# Patient Record
Sex: FEMALE | Race: WHITE | NOT HISPANIC OR LATINO | Employment: UNEMPLOYED | URBAN - METROPOLITAN AREA
[De-identification: names, ages, dates, MRNs, and addresses within clinical notes are randomized per-mention and may not be internally consistent; named-entity substitution may affect disease eponyms.]

---

## 2021-02-02 ENCOUNTER — LAB (OUTPATIENT)
Dept: LAB | Facility: HOSPITAL | Age: 86
End: 2021-02-02

## 2021-02-02 ENCOUNTER — TRANSCRIBE ORDERS (OUTPATIENT)
Dept: LAB | Facility: HOSPITAL | Age: 86
End: 2021-02-02

## 2021-02-02 DIAGNOSIS — R30.0 DYSURIA: Primary | ICD-10-CM

## 2021-02-02 DIAGNOSIS — R30.0 DYSURIA: ICD-10-CM

## 2021-02-02 PROCEDURE — 87186 SC STD MICRODIL/AGAR DIL: CPT

## 2021-02-02 PROCEDURE — 87086 URINE CULTURE/COLONY COUNT: CPT

## 2021-02-04 LAB — BACTERIA SPEC AEROBE CULT: ABNORMAL

## 2024-04-04 ENCOUNTER — NURSING HOME (OUTPATIENT)
Dept: INTERNAL MEDICINE | Facility: CLINIC | Age: 89
End: 2024-04-04
Payer: MEDICARE

## 2024-04-04 VITALS
RESPIRATION RATE: 18 BRPM | TEMPERATURE: 98.6 F | DIASTOLIC BLOOD PRESSURE: 85 MMHG | HEART RATE: 66 BPM | WEIGHT: 185.4 LBS | OXYGEN SATURATION: 99 % | SYSTOLIC BLOOD PRESSURE: 136 MMHG

## 2024-04-04 DIAGNOSIS — E03.9 HYPOTHYROIDISM, UNSPECIFIED TYPE: ICD-10-CM

## 2024-04-04 DIAGNOSIS — S82.851D CLOSED TRIMALLEOLAR FRACTURE OF RIGHT ANKLE WITH ROUTINE HEALING, SUBSEQUENT ENCOUNTER: Primary | ICD-10-CM

## 2024-04-04 DIAGNOSIS — H35.30 MACULAR DEGENERATION, UNSPECIFIED LATERALITY, UNSPECIFIED TYPE: ICD-10-CM

## 2024-04-04 DIAGNOSIS — E11.9 TYPE 2 DIABETES MELLITUS WITHOUT COMPLICATION, WITHOUT LONG-TERM CURRENT USE OF INSULIN: ICD-10-CM

## 2024-04-04 DIAGNOSIS — M81.0 OSTEOPOROSIS, UNSPECIFIED OSTEOPOROSIS TYPE, UNSPECIFIED PATHOLOGICAL FRACTURE PRESENCE: ICD-10-CM

## 2024-04-04 DIAGNOSIS — D50.0 BLOOD LOSS ANEMIA: ICD-10-CM

## 2024-04-04 DIAGNOSIS — F41.9 ANXIETY: ICD-10-CM

## 2024-04-04 DIAGNOSIS — I10 ESSENTIAL HYPERTENSION: ICD-10-CM

## 2024-04-04 DIAGNOSIS — K21.9 GASTROESOPHAGEAL REFLUX DISEASE WITHOUT ESOPHAGITIS: ICD-10-CM

## 2024-04-04 NOTE — LETTER
Nursing Home History and Physical       Jaxson Rodriguez DO  [address] [place] [address] Phone: [phone]  Fax: [phone]     PATIENT NAME: Dottie Jim                                                                          YOB: 1935           DATE OF SERVICE: 04/04/2024  FACILITY: [facility]    CHIEF COMPLAINT:  Nursing facility admission      HISTORY OF PRESENT ILLNESS:   Patient is an 88-year-old female with a history of dementia, type 2 diabetes mellitus on insulin, CKD, GERD, and hypothyroidism recently hospitalized at  after suffering a right ankle fracture.  Patient underwent TTC nail on 3/21/2024.  Her surgery was uncomplicated however postoperatively she did suffer with anemia and mild acute hypoxic respiratory failure.  Hemoglobin stabilized around 8 and there was no need for blood transfusion.  Hypoxia was thought to be secondary to atelectasis and resolved with supportive care.  Patient did have Enterococcus faecalis UTI and she was treated with ceftriaxone initially and transition to oral amoxicillin which she completed on 3/25/2024.  Patient has transferred to this facility for further strengthening and rehab as well as supportive care.    Patient was seen in her wheelchair as she was planning to see her orthopedic specialist this afternoon.  She remains nonweightbearing to the right lower extremity but has been working with physical therapy as best as she can.  Patient denied any significant complaints on exam today.  Glucose has been in reasonable control since admission to this facility.    PAST MEDICAL & SURGICAL HISTORY:   No past medical history on file.   No past surgical history on file.      MEDICATIONS:  I have reviewed and reconciled the patients medication list in the patients chart at the skilled nursing facility on 04/04/2024.      ALLERGIES:  Not on File      SOCIAL HISTORY:  Social History     Socioeconomic History    Marital  status:        FAMILY HISTORY:  No family history on file.     REVIEW OF SYSTEMS:  Review of Systems   Constitutional:  Negative for chills, fatigue and fever.   HENT:  Negative for congestion, ear pain, rhinorrhea, sinus pressure and sore throat.    Eyes:  Negative for visual disturbance.   Respiratory:  Negative for cough, chest tightness, shortness of breath and wheezing.    Cardiovascular:  Negative for chest pain, palpitations and leg swelling.   Gastrointestinal:  Negative for abdominal pain, blood in stool, constipation, diarrhea, nausea and vomiting.   Endocrine: Negative for polydipsia and polyuria.   Genitourinary:  Negative for dysuria and hematuria.   Musculoskeletal:  Negative for arthralgias and back pain.   Skin:  Negative for rash.   Neurological:  Negative for dizziness, light-headedness, numbness and headaches.   Psychiatric/Behavioral:  Negative for dysphoric mood and sleep disturbance. The patient is not nervous/anxious.          PHYSICAL EXAMINATION:   VITAL SIGNS: /85   Pulse 66   Temp 98.6 °F (37 °C)   Resp 18   Wt 84.1 kg (185 lb 6.4 oz)   SpO2 99%     Physical Exam  Vitals and nursing note reviewed.   Constitutional:       Appearance: Normal appearance. She is well-developed.   HENT:      Head: Normocephalic and atraumatic.      Nose: Nose normal.      Mouth/Throat:      Mouth: Mucous membranes are moist.      Pharynx: No oropharyngeal exudate.   Eyes:      General: No scleral icterus.     Conjunctiva/sclera: Conjunctivae normal.      Pupils: Pupils are equal, round, and reactive to light.   Neck:      Thyroid: No thyromegaly.   Cardiovascular:      Rate and Rhythm: Normal rate and regular rhythm.      Heart sounds: Normal heart sounds. No murmur heard.     No friction rub. No gallop.   Pulmonary:      Effort: Pulmonary effort is normal. No respiratory distress.      Breath sounds: Normal breath sounds. No wheezing.   Abdominal:      General: Bowel sounds are normal. There  is no distension.      Palpations: Abdomen is soft.      Tenderness: There is no abdominal tenderness.   Musculoskeletal:         General: No deformity or signs of injury.      Cervical back: Normal range of motion and neck supple.   Lymphadenopathy:      Cervical: No cervical adenopathy.   Skin:     General: Skin is warm and dry.      Findings: No rash.   Neurological:      Mental Status: She is alert and oriented to person, place, and time.   Psychiatric:         Mood and Affect: Mood normal.         Behavior: Behavior normal.         RECORDS REVIEW:   Discharge Summary from [facility] 3/28/2024  Labs 3/27/2024.  Please creatinine 0.99, sodium 135, glucose 187.  CBC hemoglobin 8.1    ASSESSMENT   Diagnoses and all orders for this visit:    1. Closed trimalleolar fracture of right ankle with routine healing, subsequent encounter (Primary)    2. Osteoporosis, unspecified osteoporosis type, unspecified pathological fracture presence    3. Blood loss anemia    4. Type 2 diabetes mellitus without complication, without long-term current use of insulin    5. Essential hypertension    6. Macular degeneration, unspecified laterality, unspecified type    7. Anxiety    8. Gastroesophageal reflux disease without esophagitis    9. Hypothyroidism, unspecified type        PLAN  Right trimalleolar ankle fracture with dislocation  - Status post TTC nail 3/21/2024  - Not currently on DVT prophylaxis.  - Currently NWB RLE with caution to wait bearing on left lower extremity due to history of fracture and left ankle.  - Pain control with acetaminophen as well as oxycodone as needed    Osteoporosis  - Continue vitamin D 1000U daily    Acute blood loss anemia  - Hemoglobin stabilized around 8 upon discharge from hospital follow-up CBC in 1 week.    Type 2 diabetes mellitus  - Continue current insulin regimen.  Glucose has been in reasonable control.    Hypertension  - Stable on current medication regimen.  Continue amlodipine.  It is  noted atenolol was held upon discharge from hospital.    Macular degeneration  - Continue current eyedrops    Anxiety  - Stable on hydroxyzine as needed    Asthma  - Stable on current inhaler regimen    GERD  - Continue Protonix    Hypothyroidism  - Stable on levothyroxine      [x]  Discussed Patient in detail with nursing/staff, addressed all needs today.     [x]  Plan of Care Reviewed   [x]  PT/OT Reviewed   []  Order Changes  []  Discharge Plans Reviewed  [x]  Advance Directive on file with Nursing Home.   [x]  POA on file with Nursing Home.    [x]  Code Status listed and reviewed.       Jaxson Rodriguez DO.  4/4/2024      **Part of this note may be an electronic transcription/translation of spoken language to printed text using the Dragon Dictation System.**

## 2024-04-05 NOTE — PROGRESS NOTES
Nursing Home History and Physical       Jaxson Rodriguez DO  [address] [place] [address] Phone: [phone]  Fax: [phone]     PATIENT NAME: Dottie Jim                                                                          YOB: 1935           DATE OF SERVICE: 04/04/2024  FACILITY: [facility]    CHIEF COMPLAINT:  Nursing facility admission      HISTORY OF PRESENT ILLNESS:   Patient is an 88-year-old female with a history of dementia, type 2 diabetes mellitus on insulin, CKD, GERD, and hypothyroidism recently hospitalized at  after suffering a right ankle fracture.  Patient underwent TTC nail on 3/21/2024.  Her surgery was uncomplicated however postoperatively she did suffer with anemia and mild acute hypoxic respiratory failure.  Hemoglobin stabilized around 8 and there was no need for blood transfusion.  Hypoxia was thought to be secondary to atelectasis and resolved with supportive care.  Patient did have Enterococcus faecalis UTI and she was treated with ceftriaxone initially and transition to oral amoxicillin which she completed on 3/25/2024.  Patient has transferred to this facility for further strengthening and rehab as well as supportive care.    Patient was seen in her wheelchair as she was planning to see her orthopedic specialist this afternoon.  She remains nonweightbearing to the right lower extremity but has been working with physical therapy as best as she can.  Patient denied any significant complaints on exam today.  Glucose has been in reasonable control since admission to this facility.    PAST MEDICAL & SURGICAL HISTORY:   No past medical history on file.   No past surgical history on file.      MEDICATIONS:  I have reviewed and reconciled the patients medication list in the patients chart at the skilled nursing facility on 04/04/2024.      ALLERGIES:  Not on File      SOCIAL HISTORY:  Social History     Socioeconomic History    Marital  status:        FAMILY HISTORY:  No family history on file.     REVIEW OF SYSTEMS:  Review of Systems   Constitutional:  Negative for chills, fatigue and fever.   HENT:  Negative for congestion, ear pain, rhinorrhea, sinus pressure and sore throat.    Eyes:  Negative for visual disturbance.   Respiratory:  Negative for cough, chest tightness, shortness of breath and wheezing.    Cardiovascular:  Negative for chest pain, palpitations and leg swelling.   Gastrointestinal:  Negative for abdominal pain, blood in stool, constipation, diarrhea, nausea and vomiting.   Endocrine: Negative for polydipsia and polyuria.   Genitourinary:  Negative for dysuria and hematuria.   Musculoskeletal:  Negative for arthralgias and back pain.   Skin:  Negative for rash.   Neurological:  Negative for dizziness, light-headedness, numbness and headaches.   Psychiatric/Behavioral:  Negative for dysphoric mood and sleep disturbance. The patient is not nervous/anxious.          PHYSICAL EXAMINATION:   VITAL SIGNS: /85   Pulse 66   Temp 98.6 °F (37 °C)   Resp 18   Wt 84.1 kg (185 lb 6.4 oz)   SpO2 99%     Physical Exam  Vitals and nursing note reviewed.   Constitutional:       Appearance: Normal appearance. She is well-developed.   HENT:      Head: Normocephalic and atraumatic.      Nose: Nose normal.      Mouth/Throat:      Mouth: Mucous membranes are moist.      Pharynx: No oropharyngeal exudate.   Eyes:      General: No scleral icterus.     Conjunctiva/sclera: Conjunctivae normal.      Pupils: Pupils are equal, round, and reactive to light.   Neck:      Thyroid: No thyromegaly.   Cardiovascular:      Rate and Rhythm: Normal rate and regular rhythm.      Heart sounds: Normal heart sounds. No murmur heard.     No friction rub. No gallop.   Pulmonary:      Effort: Pulmonary effort is normal. No respiratory distress.      Breath sounds: Normal breath sounds. No wheezing.   Abdominal:      General: Bowel sounds are normal. There  is no distension.      Palpations: Abdomen is soft.      Tenderness: There is no abdominal tenderness.   Musculoskeletal:         General: No deformity or signs of injury.      Cervical back: Normal range of motion and neck supple.   Lymphadenopathy:      Cervical: No cervical adenopathy.   Skin:     General: Skin is warm and dry.      Findings: No rash.   Neurological:      Mental Status: She is alert and oriented to person, place, and time.   Psychiatric:         Mood and Affect: Mood normal.         Behavior: Behavior normal.         RECORDS REVIEW:   Discharge Summary from [facility] 3/28/2024  Labs 3/27/2024.  Please creatinine 0.99, sodium 135, glucose 187.  CBC hemoglobin 8.1    ASSESSMENT   Diagnoses and all orders for this visit:    1. Closed trimalleolar fracture of right ankle with routine healing, subsequent encounter (Primary)    2. Osteoporosis, unspecified osteoporosis type, unspecified pathological fracture presence    3. Blood loss anemia    4. Type 2 diabetes mellitus without complication, without long-term current use of insulin    5. Essential hypertension    6. Macular degeneration, unspecified laterality, unspecified type    7. Anxiety    8. Gastroesophageal reflux disease without esophagitis    9. Hypothyroidism, unspecified type        PLAN  Right trimalleolar ankle fracture with dislocation  - Status post TTC nail 3/21/2024  - Not currently on DVT prophylaxis.  - Currently NWB RLE with caution to wait bearing on left lower extremity due to history of fracture and left ankle.  - Pain control with acetaminophen as well as oxycodone as needed    Osteoporosis  - Continue vitamin D 1000U daily    Acute blood loss anemia  - Hemoglobin stabilized around 8 upon discharge from hospital follow-up CBC in 1 week.    Type 2 diabetes mellitus  - Continue current insulin regimen.  Glucose has been in reasonable control.    Hypertension  - Stable on current medication regimen.  Continue amlodipine.  It is  noted atenolol was held upon discharge from hospital.    Macular degeneration  - Continue current eyedrops    Anxiety  - Stable on hydroxyzine as needed    Asthma  - Stable on current inhaler regimen    GERD  - Continue Protonix    Hypothyroidism  - Stable on levothyroxine      [x]  Discussed Patient in detail with nursing/staff, addressed all needs today.     [x]  Plan of Care Reviewed   [x]  PT/OT Reviewed   []  Order Changes  []  Discharge Plans Reviewed  [x]  Advance Directive on file with Nursing Home.   [x]  POA on file with Nursing Home.    [x]  Code Status listed and reviewed.       Jaxson Rodriguez DO.  4/4/2024      **Part of this note may be an electronic transcription/translation of spoken language to printed text using the Dragon Dictation System.**

## 2024-04-19 NOTE — PROGRESS NOTES
Nursing Home Progress Note        Jaxson Rodriguez    [address] [place] [address] Phone: [phone]  Fax: [phone]     PATIENT NAME: Dottie Jim                                                                          YOB: 1935           DATE OF SERVICE: 04/22/2024  FACILITY:   [facility] ______________________________________________________________________     CHIEF COMPLAINT:  Chronic Medical Management      History of Present Illness  The patient is being seen today at the nursing facility for a follow-up visit.    The patient reports experiencing continued foot pain, worsens after PT.  He is currently utilizing a boot on her right foot and has not yet been permitted to bear weight on the affected foot. She is scheduled for additional x-rays on xx/30/2023. She requires assistance with mobility, such as using the commode, and is expected to return home today. Her healthcare providers have successfully addressed all necessary arrangements, including a board for transferring. She resides with her children and has experienced one episode of diuresis.    Her discharge from the facility has been delayed due to need for IV abx for MDR UTI.  After IV medications are completed she may return home.         PAST MEDICAL & SURGICAL HISTORY:   Past Medical History:   Diagnosis Date    Acute respiratory failure with hypoxia     Allergic     Arthritis     CKD (chronic kidney disease)     Constipation, unspecified     Depression, unspecified     Displaced trimalleolar fracture of right lower leg, subsequent encounter for closed fracture with routine healing     Encephalopathy, unspecified     Essential hypertension     Fall at home     Heartburn     Hiatal hernia     Hiatal hernia with GERD without esophagitis     Hyperlipidemia     Hypothyroidism     Mood disorder     Muscle weakness (generalized)     Other iron deficiency anemias     Pain, unspecified     Type 2 diabetes  mellitus without complications     Unspecified asthma, uncomplicated     Unspecified dementia, unspecified severity, with anxiety     Unspecified macular degeneration     Urinary tract infection, site not specified       Past Surgical History:   Procedure Laterality Date    BACK SURGERY      FOOT FRACTURE SURGERY Right 03/2024    RIGHT HINDFOOT TTC FUSION NAIL         MEDICATIONS:  I have reviewed and reconciled the patients medication list in the patients chart at the skilled nursing facility today, 04/22/2024.      ALLERGIES:  Allergies   Allergen Reactions    Sulfamethoxazole-Trimethoprim Hives    Atorvastatin Unknown - High Severity         SOCIAL HISTORY:  Social History     Socioeconomic History    Marital status:    Tobacco Use    Smoking status: Unknown   Substance and Sexual Activity    Alcohol use: Defer    Drug use: Defer    Sexual activity: Defer       FAMILY HISTORY:  Family History   Problem Relation Age of Onset    Diabetes Sister     Hypertension Sister     Stroke Sister     Diabetes Brother     Hypertension Brother     Stroke Brother        REVIEW OF SYSTEMS:  Review of Systems   Constitutional:  Negative for chills, fatigue and fever.   HENT:  Negative for congestion, ear pain, rhinorrhea, sinus pressure and sore throat.    Eyes:  Negative for visual disturbance.   Respiratory:  Negative for cough, chest tightness, shortness of breath and wheezing.    Cardiovascular:  Negative for chest pain, palpitations and leg swelling.   Gastrointestinal:  Negative for abdominal pain, blood in stool, constipation, diarrhea, nausea and vomiting.   Endocrine: Negative for polydipsia and polyuria.   Genitourinary:  Negative for dysuria and hematuria.   Musculoskeletal:  Negative for arthralgias and back pain.   Skin:  Negative for rash.   Neurological:  Negative for dizziness, light-headedness, numbness and headaches.   Psychiatric/Behavioral:  Negative for dysphoric mood and sleep disturbance. The patient  is not nervous/anxious.           PHYSICAL EXAMINATION:     VITAL SIGNS:  /88   Pulse 74   Temp 98 °F (36.7 °C)   Resp 18   Wt 84.9 kg (187 lb 3.2 oz)   SpO2 95%     Physical Exam  Vitals and nursing note reviewed.   Constitutional:       Appearance: Normal appearance. She is well-developed.   HENT:      Head: Normocephalic and atraumatic.      Nose: Nose normal.      Mouth/Throat:      Mouth: Mucous membranes are moist.      Pharynx: No oropharyngeal exudate.   Eyes:      General: No scleral icterus.     Conjunctiva/sclera: Conjunctivae normal.      Pupils: Pupils are equal, round, and reactive to light.   Neck:      Thyroid: No thyromegaly.   Cardiovascular:      Rate and Rhythm: Normal rate and regular rhythm.      Heart sounds: Normal heart sounds. No murmur heard.     No friction rub. No gallop.   Pulmonary:      Effort: Pulmonary effort is normal. No respiratory distress.      Breath sounds: Normal breath sounds. No wheezing.   Abdominal:      General: Bowel sounds are normal. There is no distension.      Palpations: Abdomen is soft.      Tenderness: There is no abdominal tenderness.   Musculoskeletal:         General: No deformity or signs of injury.      Cervical back: Normal range of motion and neck supple.   Lymphadenopathy:      Cervical: No cervical adenopathy.   Skin:     General: Skin is warm and dry.      Findings: No rash.   Neurological:      Mental Status: She is alert and oriented to person, place, and time.   Psychiatric:         Mood and Affect: Mood normal.         Behavior: Behavior normal.       RECORDS REVIEW:   [unfilled]     ASSESSMENT     Diagnoses and all orders for this visit:    1. Closed trimalleolar fracture of right ankle with routine healing, subsequent encounter (Primary)    2. Osteoporosis, unspecified osteoporosis type, unspecified pathological fracture presence    3. Type 2 diabetes mellitus without complication, without long-term current use of insulin    4.  Gastroesophageal reflux disease without esophagitis    5. Anxiety    6. Essential hypertension    7. Hypothyroidism, unspecified type        PLAN  Assessment & Plan  Pain in the right foot.  The patient is advised to consult with the orthopedic specialist regarding her foot condition. Her medication regimen and laboratory results will be closely monitored. A short supply of pain medication will be provided until her subsequent appointment with her primary care physician, at which point the prescription will be refilled accordingly as outpatient.     Right trimalleolar ankle fracture with dislocation  - Status post TTC nail 3/21/2024  - Not currently on DVT prophylaxis.  - Remains NWB RLE with caution to wait bearing on left lower extremity due to history of fracture and left ankle.  - Pain control with acetaminophen as well as oxycodone as needed    MDR UTI  - complete IV abx then plan to return home.     Osteoporosis  - Continue vitamin D 1000U daily    Acute blood loss anemia  - Hb has post operatively been stable.     Type 2 diabetes mellitus  - Continue current insulin regimen in facility.  Glucose has been in reasonable control.    Hypertension  - Stable on current medication regimen.  Continue amlodipine.  Remains off Atenolol.     Macular degeneration  - Continue current eyedrops    Anxiety  - Stable on hydroxyzine as needed    Asthma  - Stable on current inhaler regimen    GERD  - Continue Protonix    Hypothyroidism  - Stable on levothyroxine        [x]  Discussed Patient in detail with nursing/staff, addressed all needs today.     [x]  Plan of Care Reviewed   []  PT/OT Reviewed   [x]  Order Changes  []  Discharge Plans Reviewed  [x]  Advance Directive on file with Nursing Home.   [x]  POA on file with Nursing Home.    [x]  Code Status listed and reviewed.     I confirm accuracy of unchanged data/findings including physical exam and plan which have been carried forward from previous visit, as well as I have  updated appropriately those that have changed        Jaxson Rodriguez DO.  4/30/2024      Patient or patient representative verbalized consent for the use of Ambient Listening during the visit with  Jaxson Rodriguez DO for chart documentation. 4/30/2024  17:49 EDT

## 2024-04-22 ENCOUNTER — NURSING HOME (OUTPATIENT)
Dept: INTERNAL MEDICINE | Facility: CLINIC | Age: 89
End: 2024-04-22
Payer: MEDICARE

## 2024-04-22 VITALS
DIASTOLIC BLOOD PRESSURE: 88 MMHG | OXYGEN SATURATION: 95 % | WEIGHT: 187.2 LBS | SYSTOLIC BLOOD PRESSURE: 149 MMHG | RESPIRATION RATE: 18 BRPM | TEMPERATURE: 98 F | HEART RATE: 74 BPM

## 2024-04-22 DIAGNOSIS — S82.851D CLOSED TRIMALLEOLAR FRACTURE OF RIGHT ANKLE WITH ROUTINE HEALING, SUBSEQUENT ENCOUNTER: Primary | ICD-10-CM

## 2024-04-22 DIAGNOSIS — M81.0 OSTEOPOROSIS, UNSPECIFIED OSTEOPOROSIS TYPE, UNSPECIFIED PATHOLOGICAL FRACTURE PRESENCE: ICD-10-CM

## 2024-04-22 DIAGNOSIS — E11.9 TYPE 2 DIABETES MELLITUS WITHOUT COMPLICATION, WITHOUT LONG-TERM CURRENT USE OF INSULIN: ICD-10-CM

## 2024-04-22 DIAGNOSIS — K21.9 GASTROESOPHAGEAL REFLUX DISEASE WITHOUT ESOPHAGITIS: ICD-10-CM

## 2024-04-22 DIAGNOSIS — I10 ESSENTIAL HYPERTENSION: ICD-10-CM

## 2024-04-22 DIAGNOSIS — F41.9 ANXIETY: ICD-10-CM

## 2024-04-22 DIAGNOSIS — E03.9 HYPOTHYROIDISM, UNSPECIFIED TYPE: ICD-10-CM

## 2024-04-22 PROCEDURE — 99308 SBSQ NF CARE LOW MDM 20: CPT | Performed by: INTERNAL MEDICINE

## 2024-04-22 NOTE — LETTER
Nursing Home Progress Note        Jaxson Rodriguez    [address] [place] [address] Phone: [phone]  Fax: [phone]     PATIENT NAME: Dottie Jim                                                                          YOB: 1935           DATE OF SERVICE: 04/22/2024  FACILITY:   [facility] ______________________________________________________________________     CHIEF COMPLAINT:  Chronic Medical Management      History of Present Illness  The patient is being seen today at the nursing facility for a follow-up visit.    The patient reports experiencing continued foot pain, worsens after PT.  He is currently utilizing a boot on her right foot and has not yet been permitted to bear weight on the affected foot. She is scheduled for additional x-rays on xx/30/2023. She requires assistance with mobility, such as using the commode, and is expected to return home today. Her healthcare providers have successfully addressed all necessary arrangements, including a board for transferring. She resides with her children and has experienced one episode of diuresis.    Her discharge from the facility has been delayed due to need for IV abx for MDR UTI.  After IV medications are completed she may return home.         PAST MEDICAL & SURGICAL HISTORY:   Past Medical History:   Diagnosis Date    Acute respiratory failure with hypoxia     Allergic     Arthritis     CKD (chronic kidney disease)     Constipation, unspecified     Depression, unspecified     Displaced trimalleolar fracture of right lower leg, subsequent encounter for closed fracture with routine healing     Encephalopathy, unspecified     Essential hypertension     Fall at home     Heartburn     Hiatal hernia     Hiatal hernia with GERD without esophagitis     Hyperlipidemia     Hypothyroidism     Mood disorder     Muscle weakness (generalized)     Other iron deficiency anemias     Pain, unspecified     Type 2 diabetes  mellitus without complications     Unspecified asthma, uncomplicated     Unspecified dementia, unspecified severity, with anxiety     Unspecified macular degeneration     Urinary tract infection, site not specified       Past Surgical History:   Procedure Laterality Date    BACK SURGERY      FOOT FRACTURE SURGERY Right 03/2024    RIGHT HINDFOOT TTC FUSION NAIL         MEDICATIONS:  I have reviewed and reconciled the patients medication list in the patients chart at the skilled nursing facility today, 04/22/2024.      ALLERGIES:  Allergies   Allergen Reactions    Sulfamethoxazole-Trimethoprim Hives    Atorvastatin Unknown - High Severity         SOCIAL HISTORY:  Social History     Socioeconomic History    Marital status:    Tobacco Use    Smoking status: Unknown   Substance and Sexual Activity    Alcohol use: Defer    Drug use: Defer    Sexual activity: Defer       FAMILY HISTORY:  Family History   Problem Relation Age of Onset    Diabetes Sister     Hypertension Sister     Stroke Sister     Diabetes Brother     Hypertension Brother     Stroke Brother        REVIEW OF SYSTEMS:  Review of Systems   Constitutional:  Negative for chills, fatigue and fever.   HENT:  Negative for congestion, ear pain, rhinorrhea, sinus pressure and sore throat.    Eyes:  Negative for visual disturbance.   Respiratory:  Negative for cough, chest tightness, shortness of breath and wheezing.    Cardiovascular:  Negative for chest pain, palpitations and leg swelling.   Gastrointestinal:  Negative for abdominal pain, blood in stool, constipation, diarrhea, nausea and vomiting.   Endocrine: Negative for polydipsia and polyuria.   Genitourinary:  Negative for dysuria and hematuria.   Musculoskeletal:  Negative for arthralgias and back pain.   Skin:  Negative for rash.   Neurological:  Negative for dizziness, light-headedness, numbness and headaches.   Psychiatric/Behavioral:  Negative for dysphoric mood and sleep disturbance. The patient  is not nervous/anxious.           PHYSICAL EXAMINATION:     VITAL SIGNS:  /88   Pulse 74   Temp 98 °F (36.7 °C)   Resp 18   Wt 84.9 kg (187 lb 3.2 oz)   SpO2 95%     Physical Exam  Vitals and nursing note reviewed.   Constitutional:       Appearance: Normal appearance. She is well-developed.   HENT:      Head: Normocephalic and atraumatic.      Nose: Nose normal.      Mouth/Throat:      Mouth: Mucous membranes are moist.      Pharynx: No oropharyngeal exudate.   Eyes:      General: No scleral icterus.     Conjunctiva/sclera: Conjunctivae normal.      Pupils: Pupils are equal, round, and reactive to light.   Neck:      Thyroid: No thyromegaly.   Cardiovascular:      Rate and Rhythm: Normal rate and regular rhythm.      Heart sounds: Normal heart sounds. No murmur heard.     No friction rub. No gallop.   Pulmonary:      Effort: Pulmonary effort is normal. No respiratory distress.      Breath sounds: Normal breath sounds. No wheezing.   Abdominal:      General: Bowel sounds are normal. There is no distension.      Palpations: Abdomen is soft.      Tenderness: There is no abdominal tenderness.   Musculoskeletal:         General: No deformity or signs of injury.      Cervical back: Normal range of motion and neck supple.   Lymphadenopathy:      Cervical: No cervical adenopathy.   Skin:     General: Skin is warm and dry.      Findings: No rash.   Neurological:      Mental Status: She is alert and oriented to person, place, and time.   Psychiatric:         Mood and Affect: Mood normal.         Behavior: Behavior normal.       RECORDS REVIEW:   [unfilled]     ASSESSMENT     Diagnoses and all orders for this visit:    1. Closed trimalleolar fracture of right ankle with routine healing, subsequent encounter (Primary)    2. Osteoporosis, unspecified osteoporosis type, unspecified pathological fracture presence    3. Type 2 diabetes mellitus without complication, without long-term current use of insulin    4.  Gastroesophageal reflux disease without esophagitis    5. Anxiety    6. Essential hypertension    7. Hypothyroidism, unspecified type        PLAN  Assessment & Plan  Pain in the right foot.  The patient is advised to consult with the orthopedic specialist regarding her foot condition. Her medication regimen and laboratory results will be closely monitored. A short supply of pain medication will be provided until her subsequent appointment with her primary care physician, at which point the prescription will be refilled accordingly as outpatient.     Right trimalleolar ankle fracture with dislocation  - Status post TTC nail 3/21/2024  - Not currently on DVT prophylaxis.  - Remains NWB RLE with caution to wait bearing on left lower extremity due to history of fracture and left ankle.  - Pain control with acetaminophen as well as oxycodone as needed    MDR UTI  - complete IV abx then plan to return home.     Osteoporosis  - Continue vitamin D 1000U daily    Acute blood loss anemia  - Hb has post operatively been stable.     Type 2 diabetes mellitus  - Continue current insulin regimen in facility.  Glucose has been in reasonable control.    Hypertension  - Stable on current medication regimen.  Continue amlodipine.  Remains off Atenolol.     Macular degeneration  - Continue current eyedrops    Anxiety  - Stable on hydroxyzine as needed    Asthma  - Stable on current inhaler regimen    GERD  - Continue Protonix    Hypothyroidism  - Stable on levothyroxine        [x]  Discussed Patient in detail with nursing/staff, addressed all needs today.     [x]  Plan of Care Reviewed   []  PT/OT Reviewed   [x]  Order Changes  []  Discharge Plans Reviewed  [x]  Advance Directive on file with Nursing Home.   [x]  POA on file with Nursing Home.    [x]  Code Status listed and reviewed.     I confirm accuracy of unchanged data/findings including physical exam and plan which have been carried forward from previous visit, as well as I have  updated appropriately those that have changed        Jaxson Rodriguez DO.  4/30/2024      Patient or patient representative verbalized consent for the use of Ambient Listening during the visit with  Jaxson Rodriguez DO for chart documentation. 4/30/2024  17:49 EDT

## 2024-04-23 ENCOUNTER — DOCUMENTATION (OUTPATIENT)
Dept: FAMILY MEDICINE CLINIC | Facility: CLINIC | Age: 89
End: 2024-04-23
Payer: MEDICARE

## 2024-04-23 RX ORDER — FLUTICASONE FUROATE, UMECLIDINIUM BROMIDE AND VILANTEROL TRIFENATATE 100; 62.5; 25 UG/1; UG/1; UG/1
1 POWDER RESPIRATORY (INHALATION)
COMMUNITY

## 2024-04-23 RX ORDER — LEVOTHYROXINE SODIUM 88 UG/1
88 TABLET ORAL DAILY
COMMUNITY

## 2024-04-23 RX ORDER — DULOXETIN HYDROCHLORIDE 60 MG/1
60 CAPSULE, DELAYED RELEASE ORAL DAILY
COMMUNITY

## 2024-04-23 RX ORDER — CHOLECALCIFEROL (VITAMIN D3) 125 MCG
5 CAPSULE ORAL NIGHTLY
COMMUNITY

## 2024-04-23 RX ORDER — ALBUTEROL SULFATE 90 UG/1
2 AEROSOL, METERED RESPIRATORY (INHALATION) EVERY 4 HOURS PRN
COMMUNITY

## 2024-04-23 RX ORDER — MONTELUKAST SODIUM 10 MG/1
10 TABLET ORAL NIGHTLY
COMMUNITY

## 2024-04-23 RX ORDER — VIT A/VIT C/VIT E/ZINC/COPPER 2148-113
1 TABLET ORAL DAILY
COMMUNITY

## 2024-04-23 RX ORDER — SUCRALFATE 1 G/1
1 TABLET ORAL 4 TIMES DAILY
COMMUNITY

## 2024-04-23 RX ORDER — GABAPENTIN 100 MG/1
100 CAPSULE ORAL 3 TIMES DAILY
COMMUNITY

## 2024-04-23 RX ORDER — CALCIUM CARBONATE 500 MG/1
1 TABLET, CHEWABLE ORAL EVERY 4 HOURS PRN
COMMUNITY

## 2024-04-23 RX ORDER — AMLODIPINE BESYLATE 5 MG/1
5 TABLET ORAL DAILY
COMMUNITY

## 2024-04-23 RX ORDER — FLUTICASONE PROPIONATE 50 MCG
2 SPRAY, SUSPENSION (ML) NASAL DAILY
COMMUNITY

## 2024-04-23 RX ORDER — FLUORIDE TOOTHPASTE
15 TOOTHPASTE DENTAL AS NEEDED
COMMUNITY

## 2024-04-23 RX ORDER — ACETAMINOPHEN 500 MG
1000 TABLET ORAL 3 TIMES DAILY
COMMUNITY

## 2024-04-23 RX ORDER — ONDANSETRON 4 MG/1
4 TABLET, FILM COATED ORAL EVERY 8 HOURS PRN
COMMUNITY

## 2024-04-23 RX ORDER — PHENAZOPYRIDINE HYDROCHLORIDE 100 MG/1
100 TABLET, FILM COATED ORAL 3 TIMES DAILY PRN
COMMUNITY

## 2024-04-23 RX ORDER — PRAVASTATIN SODIUM 40 MG
40 TABLET ORAL DAILY
COMMUNITY

## 2024-04-23 RX ORDER — POLYETHYLENE GLYCOL 3350 17 G/17G
17 POWDER, FOR SOLUTION ORAL DAILY
COMMUNITY

## 2024-04-23 RX ORDER — OXYCODONE HYDROCHLORIDE 5 MG/1
5 TABLET ORAL EVERY 8 HOURS
COMMUNITY

## 2024-04-23 RX ORDER — CILOSTAZOL 100 MG/1
100 TABLET ORAL 2 TIMES DAILY
COMMUNITY

## 2024-04-23 RX ORDER — SENNOSIDES A AND B 8.6 MG/1
2 TABLET, FILM COATED ORAL DAILY
COMMUNITY

## 2024-04-23 RX ORDER — METHOCARBAMOL 500 MG/1
500 TABLET, FILM COATED ORAL 3 TIMES DAILY PRN
COMMUNITY

## 2024-04-23 RX ORDER — INSULIN GLARGINE 100 [IU]/ML
8 INJECTION, SOLUTION SUBCUTANEOUS DAILY
COMMUNITY

## 2024-04-23 RX ORDER — OMEPRAZOLE 20 MG/1
20 CAPSULE, DELAYED RELEASE ORAL DAILY
COMMUNITY

## 2024-04-23 RX ORDER — HYDROXYZINE HYDROCHLORIDE 10 MG/1
10 TABLET, FILM COATED ORAL 3 TIMES DAILY
COMMUNITY

## 2024-04-23 RX ORDER — ACETAMINOPHEN 500 MG
1000 TABLET ORAL EVERY 6 HOURS PRN
COMMUNITY

## 2024-04-23 NOTE — TELEPHONE ENCOUNTER
[facility] REQUESTING MED REFILL FOR OXYCODONE 5 MG.    DIRECTIONS: OXYCODONE 5 MG 1 TAB PO Q 8 HRS.

## 2024-04-24 RX ORDER — OXYCODONE HYDROCHLORIDE 5 MG/1
5 TABLET ORAL EVERY 8 HOURS
Qty: 90 TABLET | Refills: 0 | Status: SHIPPED | OUTPATIENT
Start: 2024-04-24

## 2025-05-15 ENCOUNTER — NURSING HOME (OUTPATIENT)
Dept: INTERNAL MEDICINE | Facility: CLINIC | Age: 89
End: 2025-05-15
Payer: MEDICARE

## 2025-05-15 VITALS
DIASTOLIC BLOOD PRESSURE: 68 MMHG | OXYGEN SATURATION: 94 % | TEMPERATURE: 98 F | HEIGHT: 67 IN | HEART RATE: 78 BPM | BODY MASS INDEX: 28.97 KG/M2 | WEIGHT: 184.6 LBS | SYSTOLIC BLOOD PRESSURE: 144 MMHG | RESPIRATION RATE: 20 BRPM

## 2025-05-15 DIAGNOSIS — N39.0 SEPSIS DUE TO URINARY TRACT INFECTION: ICD-10-CM

## 2025-05-15 DIAGNOSIS — G30.1 MODERATE LATE ONSET ALZHEIMER'S DEMENTIA WITH ANXIETY: ICD-10-CM

## 2025-05-15 DIAGNOSIS — H35.30 MACULAR DEGENERATION, UNSPECIFIED LATERALITY, UNSPECIFIED TYPE: ICD-10-CM

## 2025-05-15 DIAGNOSIS — K21.9 GASTROESOPHAGEAL REFLUX DISEASE WITHOUT ESOPHAGITIS: ICD-10-CM

## 2025-05-15 DIAGNOSIS — A41.9 SEPSIS DUE TO URINARY TRACT INFECTION: ICD-10-CM

## 2025-05-15 DIAGNOSIS — L89.229 PRESSURE INJURY OF SKIN OF LEFT THIGH, UNSPECIFIED INJURY STAGE: ICD-10-CM

## 2025-05-15 DIAGNOSIS — E11.9 TYPE 2 DIABETES MELLITUS WITHOUT COMPLICATION, WITHOUT LONG-TERM CURRENT USE OF INSULIN: ICD-10-CM

## 2025-05-15 DIAGNOSIS — I10 ESSENTIAL HYPERTENSION: ICD-10-CM

## 2025-05-15 DIAGNOSIS — S82.851D CLOSED TRIMALLEOLAR FRACTURE OF RIGHT ANKLE WITH ROUTINE HEALING, SUBSEQUENT ENCOUNTER: Primary | ICD-10-CM

## 2025-05-15 DIAGNOSIS — M81.0 OSTEOPOROSIS, UNSPECIFIED OSTEOPOROSIS TYPE, UNSPECIFIED PATHOLOGICAL FRACTURE PRESENCE: ICD-10-CM

## 2025-05-15 DIAGNOSIS — R07.89 OTHER CHEST PAIN: ICD-10-CM

## 2025-05-15 DIAGNOSIS — G89.4 CHRONIC PAIN SYNDROME: ICD-10-CM

## 2025-05-15 DIAGNOSIS — D50.0 BLOOD LOSS ANEMIA: ICD-10-CM

## 2025-05-15 DIAGNOSIS — F02.B4 MODERATE LATE ONSET ALZHEIMER'S DEMENTIA WITH ANXIETY: ICD-10-CM

## 2025-05-15 DIAGNOSIS — E03.9 HYPOTHYROIDISM, UNSPECIFIED TYPE: ICD-10-CM

## 2025-05-15 DIAGNOSIS — F41.9 ANXIETY: ICD-10-CM

## 2025-05-15 NOTE — LETTER
Nursing Home History and Physical       Jaxson Rodriguez DO  [address] [place] [address] Phone: [phone]  Fax: [phone]     PATIENT NAME: Dottie Jim                                                                          YOB: 1935           DATE OF SERVICE: 05/15/2025  FACILITY:  [facility]    CHIEF COMPLAINT:  Nursing facility admission    History of Present Illness  The patient is an 89-year-old female with a history of dementia, arthritis, and asthma, recently hospitalized at [facility] from 05/01/2025 to 05/10/2025 for concerns of malaise and worsening pressure injury to the left lateral thigh. Upon presentation to the emergency room, she was also found to have sepsis as well as ESBL UTI. She completed antibiotics during hospitalization. Due to altered mental status, her Cymbalta and Remeron were initially on hold but were then restarted upon discharge from the hospital. She has completed Invanz and Zyvox for her UTI, pneumonia, and pressure wound.     She reports a recent fall, which resulted in a fracture that necessitated surgical intervention and subsequent hospitalization. She has been informed that a third fracture of the same ankle may not be amenable to surgical repair. She has been using a walker for mobility.    She continues to experience chest pain, which her daughter attributes to a hernia.    PAST SURGICAL HISTORY:  - Bedside debridement of left thigh decubitus wound on 05/02/2025  - Surgical intervention for recent fracture       PAST MEDICAL & SURGICAL HISTORY:   Past Medical History:   Diagnosis Date   • Acute respiratory failure with hypoxia    • Allergic    • Arthritis    • CKD (chronic kidney disease)    • Constipation, unspecified    • Depression, unspecified    • Displaced trimalleolar fracture of right lower leg, subsequent encounter for closed fracture with routine healing    • Encephalopathy, unspecified    • Essential  hypertension    • Fall at home    • Heartburn    • Hiatal hernia    • Hiatal hernia with GERD without esophagitis    • Hyperlipidemia    • Hypothyroidism    • Mood disorder    • Muscle weakness (generalized)    • Other iron deficiency anemias    • Pain, unspecified    • Type 2 diabetes mellitus without complications    • Unspecified asthma, uncomplicated    • Unspecified dementia, unspecified severity, with anxiety    • Unspecified macular degeneration    • Urinary tract infection, site not specified       Past Surgical History:   Procedure Laterality Date   • BACK SURGERY     • FOOT FRACTURE SURGERY Right 03/2024    RIGHT HINDFOOT TTC FUSION NAIL         MEDICATIONS:  I have reviewed and reconciled the patients medication list in the patients chart at the [facility] nursing facility on 05/15/2025.      ALLERGIES:  Allergies   Allergen Reactions   • Sulfamethoxazole-Trimethoprim Hives   • Atorvastatin Unknown - High Severity         SOCIAL HISTORY:  Social History     Socioeconomic History   • Marital status:    Tobacco Use   • Smoking status: Unknown   Substance and Sexual Activity   • Alcohol use: Defer   • Drug use: Defer   • Sexual activity: Defer       FAMILY HISTORY:  Family History   Problem Relation Age of Onset   • Diabetes Sister    • Hypertension Sister    • Stroke Sister    • Diabetes Brother    • Hypertension Brother    • Stroke Brother         REVIEW OF SYSTEMS:  Review of Systems   Constitutional:  Negative for chills, fatigue and fever.   HENT:  Negative for congestion, ear pain, rhinorrhea, sinus pressure and sore throat.    Eyes:  Negative for visual disturbance.   Respiratory:  Negative for cough, chest tightness, shortness of breath and wheezing.    Cardiovascular:  Negative for chest pain, palpitations and leg swelling.   Gastrointestinal:  Negative for abdominal pain, blood in stool, constipation, diarrhea, nausea and vomiting.   Endocrine: Negative for polydipsia and polyuria.  "  Genitourinary:  Negative for dysuria and hematuria.   Musculoskeletal:  Negative for arthralgias and back pain.   Skin:  Negative for rash.   Neurological:  Negative for dizziness, light-headedness, numbness and headaches.   Psychiatric/Behavioral:  Negative for dysphoric mood and sleep disturbance. The patient is not nervous/anxious.          PHYSICAL EXAMINATION:   VITAL SIGNS: /68   Pulse 78   Temp 98 °F (36.7 °C)   Resp 20   Ht 170.2 cm (67\")   Wt 83.7 kg (184 lb 9.6 oz)   SpO2 94%   BMI 28.91 kg/m²     Physical Exam  Vitals and nursing note reviewed.   Constitutional:       Appearance: Normal appearance. She is well-developed.   HENT:      Head: Normocephalic and atraumatic.      Nose: Nose normal.      Mouth/Throat:      Mouth: Mucous membranes are moist.      Pharynx: No oropharyngeal exudate.   Eyes:      General: No scleral icterus.     Conjunctiva/sclera: Conjunctivae normal.      Pupils: Pupils are equal, round, and reactive to light.   Neck:      Thyroid: No thyromegaly.   Cardiovascular:      Rate and Rhythm: Normal rate and regular rhythm.      Heart sounds: Normal heart sounds. No murmur heard.     No friction rub. No gallop.   Pulmonary:      Effort: Pulmonary effort is normal. No respiratory distress.      Breath sounds: Normal breath sounds. No wheezing.   Abdominal:      General: Bowel sounds are normal. There is no distension.      Palpations: Abdomen is soft.      Tenderness: There is no abdominal tenderness.   Musculoskeletal:         General: No deformity or signs of injury.      Cervical back: Normal range of motion and neck supple.   Lymphadenopathy:      Cervical: No cervical adenopathy.   Skin:     General: Skin is warm and dry.      Findings: No rash.   Neurological:      Mental Status: She is alert and oriented to person, place, and time.   Psychiatric:         Mood and Affect: Mood normal.         Behavior: Behavior normal.         RECORDS REVIEW:   Discharge Summary St " [facility] 5/10/2025  Results      ASSESSMENT   Diagnoses and all orders for this visit:    1. Closed trimalleolar fracture of right ankle with routine healing, subsequent encounter (Primary)    2. Osteoporosis, unspecified osteoporosis type, unspecified pathological fracture presence    3. Type 2 diabetes mellitus without complication, without long-term current use of insulin    4. Gastroesophageal reflux disease without esophagitis    5. Blood loss anemia    6. Hypothyroidism, unspecified type    7. Essential hypertension    8. Anxiety    9. Macular degeneration, unspecified laterality, unspecified type    10. Sepsis due to urinary tract infection    11. Pressure injury of skin of left thigh, unspecified injury stage    12. 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 Chronic pain syndrome    14. Other chest pain        Assessment & Plan  1. Sepsis secondary to recurrent urinary tract infections.  - This condition is currently resolved. Vigilant monitoring for any signs of recurrent symptoms is necessary.    2. Pneumonia with known right-sided pleurisy.  - The condition is stable at present, with respiratory function at baseline.    3. Left thigh decubitus wound.  - She underwent bedside debridement on 05/02/2025 in the hospital and has since shown improvement.  - She has completed her antibiotic course in the hospital.  - Continued monitoring for signs of worsening infection is essential.    4. Dementia.  - Supportive care in the nursing facility will be continued.    5. Type 2 diabetes mellitus.  - Her condition is under fair control with the current regimen, which includes Tresiba 16 units daily.    6. Mood disorder and anxiety.  - The current treatment plan includes Seroquel 25 mg nightly, Remeron 7.5 mg nightly, and hydroxyzine as needed for anxiety.    7. Chronic pain.  - Pain management is being overseen by a specialist in the facility to assist with medication management.  - The  current treatment plan includes gabapentin and Norco.    8. Peripheral artery disease.  - The current treatment plan includes cilostazol.    9. Hypertension.  - The current treatment plan includes amlodipine 10 mg daily.    10. DVT prophylaxis.  - She is on DVT prophylaxis with heparin, which should be continued until 05/20/2025.    11. Chest pain- non cardiac  - She continues to experience chest pain, which her daughter attributes to a hernia.       [x]  Discussed Patient in detail with nursing/staff, addressed all needs today.     [x]  Plan of Care Reviewed   [x]  PT/OT Reviewed   []  Order Changes  []  Discharge Plans Reviewed  [x]  Advance Directive on file with Nursing Home.   [x]  POA on file with Nursing Home.    [x]  Code Status listed and reviewed.     Jaxson Rodriguez DO.  5/17/2025      **Part of this note may be an electronic transcription/translation of spoken language to printed text using the Dragon Dictation System.**    Patient or patient representative verbalized consent for the use of Ambient Listening during the visit with  Jaxson Rodriguez DO for chart documentation. 5/17/2025  10:32 EDT

## 2025-05-16 NOTE — PROGRESS NOTES
Nursing Home History and Physical       Jaxson Rodriguez DO  [address] [place] [address] Phone: [phone]  Fax: [phone]     PATIENT NAME: Dottie Jim                                                                          YOB: 1935           DATE OF SERVICE: 05/15/2025  FACILITY:  [facility]    CHIEF COMPLAINT:  Nursing facility admission    History of Present Illness  The patient is an 89-year-old female with a history of dementia, arthritis, and asthma, recently hospitalized at [facility] from 05/01/2025 to 05/10/2025 for concerns of malaise and worsening pressure injury to the left lateral thigh. Upon presentation to the emergency room, she was also found to have sepsis as well as ESBL UTI. She completed antibiotics during hospitalization. Due to altered mental status, her Cymbalta and Remeron were initially on hold but were then restarted upon discharge from the hospital. She has completed Invanz and Zyvox for her UTI, pneumonia, and pressure wound.     She reports a recent fall, which resulted in a fracture that necessitated surgical intervention and subsequent hospitalization. She has been informed that a third fracture of the same ankle may not be amenable to surgical repair. She has been using a walker for mobility.    She continues to experience chest pain, which her daughter attributes to a hernia.    PAST SURGICAL HISTORY:  - Bedside debridement of left thigh decubitus wound on 05/02/2025  - Surgical intervention for recent fracture       PAST MEDICAL & SURGICAL HISTORY:   Past Medical History:   Diagnosis Date    Acute respiratory failure with hypoxia     Allergic     Arthritis     CKD (chronic kidney disease)     Constipation, unspecified     Depression, unspecified     Displaced trimalleolar fracture of right lower leg, subsequent encounter for closed fracture with routine healing     Encephalopathy, unspecified     Essential hypertension      Fall at home     Heartburn     Hiatal hernia     Hiatal hernia with GERD without esophagitis     Hyperlipidemia     Hypothyroidism     Mood disorder     Muscle weakness (generalized)     Other iron deficiency anemias     Pain, unspecified     Type 2 diabetes mellitus without complications     Unspecified asthma, uncomplicated     Unspecified dementia, unspecified severity, with anxiety     Unspecified macular degeneration     Urinary tract infection, site not specified       Past Surgical History:   Procedure Laterality Date    BACK SURGERY      FOOT FRACTURE SURGERY Right 03/2024    RIGHT HINDFOOT TTC FUSION NAIL         MEDICATIONS:  I have reviewed and reconciled the patients medication list in the patients chart at the skilled nursing facility on 05/15/2025.      ALLERGIES:  Allergies   Allergen Reactions    Sulfamethoxazole-Trimethoprim Hives    Atorvastatin Unknown - High Severity         SOCIAL HISTORY:  Social History     Socioeconomic History    Marital status:    Tobacco Use    Smoking status: Unknown   Substance and Sexual Activity    Alcohol use: Defer    Drug use: Defer    Sexual activity: Defer       FAMILY HISTORY:  Family History   Problem Relation Age of Onset    Diabetes Sister     Hypertension Sister     Stroke Sister     Diabetes Brother     Hypertension Brother     Stroke Brother         REVIEW OF SYSTEMS:  Review of Systems   Constitutional:  Negative for chills, fatigue and fever.   HENT:  Negative for congestion, ear pain, rhinorrhea, sinus pressure and sore throat.    Eyes:  Negative for visual disturbance.   Respiratory:  Negative for cough, chest tightness, shortness of breath and wheezing.    Cardiovascular:  Negative for chest pain, palpitations and leg swelling.   Gastrointestinal:  Negative for abdominal pain, blood in stool, constipation, diarrhea, nausea and vomiting.   Endocrine: Negative for polydipsia and polyuria.   Genitourinary:  Negative for dysuria and hematuria.  "  Musculoskeletal:  Negative for arthralgias and back pain.   Skin:  Negative for rash.   Neurological:  Negative for dizziness, light-headedness, numbness and headaches.   Psychiatric/Behavioral:  Negative for dysphoric mood and sleep disturbance. The patient is not nervous/anxious.          PHYSICAL EXAMINATION:   VITAL SIGNS: /68   Pulse 78   Temp 98 °F (36.7 °C)   Resp 20   Ht 170.2 cm (67\")   Wt 83.7 kg (184 lb 9.6 oz)   SpO2 94%   BMI 28.91 kg/m²     Physical Exam  Vitals and nursing note reviewed.   Constitutional:       Appearance: Normal appearance. She is well-developed.   HENT:      Head: Normocephalic and atraumatic.      Nose: Nose normal.      Mouth/Throat:      Mouth: Mucous membranes are moist.      Pharynx: No oropharyngeal exudate.   Eyes:      General: No scleral icterus.     Conjunctiva/sclera: Conjunctivae normal.      Pupils: Pupils are equal, round, and reactive to light.   Neck:      Thyroid: No thyromegaly.   Cardiovascular:      Rate and Rhythm: Normal rate and regular rhythm.      Heart sounds: Normal heart sounds. No murmur heard.     No friction rub. No gallop.   Pulmonary:      Effort: Pulmonary effort is normal. No respiratory distress.      Breath sounds: Normal breath sounds. No wheezing.   Abdominal:      General: Bowel sounds are normal. There is no distension.      Palpations: Abdomen is soft.      Tenderness: There is no abdominal tenderness.   Musculoskeletal:         General: No deformity or signs of injury.      Cervical back: Normal range of motion and neck supple.   Lymphadenopathy:      Cervical: No cervical adenopathy.   Skin:     General: Skin is warm and dry.      Findings: No rash.   Neurological:      Mental Status: She is alert and oriented to person, place, and time.   Psychiatric:         Mood and Affect: Mood normal.         Behavior: Behavior normal.         RECORDS REVIEW:   Discharge Summary [facility] 5/10/2025  Results      ASSESSMENT "   Diagnoses and all orders for this visit:    1. Closed trimalleolar fracture of right ankle with routine healing, subsequent encounter (Primary)    2. Osteoporosis, unspecified osteoporosis type, unspecified pathological fracture presence    3. Type 2 diabetes mellitus without complication, without long-term current use of insulin    4. Gastroesophageal reflux disease without esophagitis    5. Blood loss anemia    6. Hypothyroidism, unspecified type    7. Essential hypertension    8. Anxiety    9. Macular degeneration, unspecified laterality, unspecified type    10. Sepsis due to urinary tract infection    11. Pressure injury of skin of left thigh, unspecified injury stage    12. 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 Chronic pain syndrome    14. Other chest pain        Assessment & Plan  1. Sepsis secondary to recurrent urinary tract infections.  - This condition is currently resolved. Vigilant monitoring for any signs of recurrent symptoms is necessary.    2. Pneumonia with known right-sided pleurisy.  - The condition is stable at present, with respiratory function at baseline.    3. Left thigh decubitus wound.  - She underwent bedside debridement on 05/02/2025 in the hospital and has since shown improvement.  - She has completed her antibiotic course in the hospital.  - Continued monitoring for signs of worsening infection is essential.    4. Dementia.  - Supportive care in the nursing facility will be continued.    5. Type 2 diabetes mellitus.  - Her condition is under fair control with the current regimen, which includes Tresiba 16 units daily.    6. Mood disorder and anxiety.  - The current treatment plan includes Seroquel 25 mg nightly, Remeron 7.5 mg nightly, and hydroxyzine as needed for anxiety.    7. Chronic pain.  - Pain management is being overseen by a specialist in the facility to assist with medication management.  - The current treatment plan includes gabapentin and  Norco.    8. Peripheral artery disease.  - The current treatment plan includes cilostazol.    9. Hypertension.  - The current treatment plan includes amlodipine 10 mg daily.    10. DVT prophylaxis.  - She is on DVT prophylaxis with heparin, which should be continued until 05/20/2025.    11. Chest pain- non cardiac  - She continues to experience chest pain, which her daughter attributes to a hernia.       [x]  Discussed Patient in detail with nursing/staff, addressed all needs today.     [x]  Plan of Care Reviewed   [x]  PT/OT Reviewed   []  Order Changes  []  Discharge Plans Reviewed  [x]  Advance Directive on file with Nursing Home.   [x]  POA on file with Nursing Home.    [x]  Code Status listed and reviewed.     Jaxson Rodriguez DO.  5/17/2025      **Part of this note may be an electronic transcription/translation of spoken language to printed text using the Dragon Dictation System.**    Patient or patient representative verbalized consent for the use of Ambient Listening during the visit with  Jaxson Rodriguez DO for chart documentation. 5/17/2025  10:32 EDT

## 2025-07-24 ENCOUNTER — NURSING HOME (OUTPATIENT)
Dept: INTERNAL MEDICINE | Facility: CLINIC | Age: 89
End: 2025-07-24
Payer: MEDICARE

## 2025-07-24 VITALS
RESPIRATION RATE: 20 BRPM | HEIGHT: 67 IN | TEMPERATURE: 97.5 F | BODY MASS INDEX: 29.51 KG/M2 | HEART RATE: 84 BPM | WEIGHT: 188 LBS | DIASTOLIC BLOOD PRESSURE: 80 MMHG | OXYGEN SATURATION: 96 % | SYSTOLIC BLOOD PRESSURE: 132 MMHG

## 2025-07-24 DIAGNOSIS — F02.B4 MODERATE LATE ONSET ALZHEIMER'S DEMENTIA WITH ANXIETY: ICD-10-CM

## 2025-07-24 DIAGNOSIS — M25.452 HIP SWELLING, LEFT: Primary | ICD-10-CM

## 2025-07-24 DIAGNOSIS — E03.9 HYPOTHYROIDISM, UNSPECIFIED TYPE: ICD-10-CM

## 2025-07-24 DIAGNOSIS — H35.30 MACULAR DEGENERATION, UNSPECIFIED LATERALITY, UNSPECIFIED TYPE: ICD-10-CM

## 2025-07-24 DIAGNOSIS — F41.9 ANXIETY: ICD-10-CM

## 2025-07-24 DIAGNOSIS — G30.1 MODERATE LATE ONSET ALZHEIMER'S DEMENTIA WITH ANXIETY: ICD-10-CM

## 2025-07-24 DIAGNOSIS — K21.9 GASTROESOPHAGEAL REFLUX DISEASE WITHOUT ESOPHAGITIS: ICD-10-CM

## 2025-07-24 DIAGNOSIS — G89.4 CHRONIC PAIN SYNDROME: ICD-10-CM

## 2025-07-24 DIAGNOSIS — I10 ESSENTIAL HYPERTENSION: ICD-10-CM

## 2025-07-24 DIAGNOSIS — E11.9 TYPE 2 DIABETES MELLITUS WITHOUT COMPLICATION, WITHOUT LONG-TERM CURRENT USE OF INSULIN: ICD-10-CM

## 2025-07-24 DIAGNOSIS — S82.851D CLOSED TRIMALLEOLAR FRACTURE OF RIGHT ANKLE WITH ROUTINE HEALING, SUBSEQUENT ENCOUNTER: ICD-10-CM

## 2025-07-24 PROCEDURE — 99309 SBSQ NF CARE MODERATE MDM 30: CPT | Performed by: INTERNAL MEDICINE

## 2025-07-24 NOTE — PROGRESS NOTES
Nursing Home Progress Note        Jaxson Rodriguez DO   [address] [place] [address] Phone: [phone]  Fax: [phone]     PATIENT NAME: Dottie Jim                                                                          YOB: 1935           DATE OF SERVICE: 07/24/2025  FACILITY: [facility]       CHIEF COMPLAINT:  Chronic Medical Management      History of Present Illness  The patient presents for evaluation of a hip wound and chronic medical management.  She has been stable on current medications with regards to glucose control, mood, and hypothyroidism.     She reports that her hip is in a satisfactory condition. However, she experienced some fatigue earlier today. She has been advised to undergo an ultrasound examination to investigate the source of the drainage from her wound. She applied a patch to the wound this morning.       PAST MEDICAL & SURGICAL HISTORY:   Past Medical History:   Diagnosis Date    Acute respiratory failure with hypoxia     Allergic     Arthritis     CKD (chronic kidney disease)     Constipation, unspecified     Depression, unspecified     Displaced trimalleolar fracture of right lower leg, subsequent encounter for closed fracture with routine healing     Encephalopathy, unspecified     Essential hypertension     Fall at home     Heartburn     Hiatal hernia     Hiatal hernia with GERD without esophagitis     Hyperlipidemia     Hypothyroidism     Mood disorder     Muscle weakness (generalized)     Other iron deficiency anemias     Pain, unspecified     Type 2 diabetes mellitus without complications     Unspecified asthma, uncomplicated     Unspecified dementia, unspecified severity, with anxiety     Unspecified macular degeneration     Urinary tract infection, site not specified       Past Surgical History:   Procedure Laterality Date    BACK SURGERY      FOOT FRACTURE SURGERY Right 03/2024    RIGHT HINDFOOT TTC FUSION NAIL         MEDICATIONS:  I  "have reviewed and reconciled the patients medication list in the patients chart at the skilled nursing facility today, 07/24/2025.      ALLERGIES:  Allergies   Allergen Reactions    Sulfamethoxazole-Trimethoprim Hives    Atorvastatin Unknown - High Severity         SOCIAL HISTORY:  Social History     Socioeconomic History    Marital status:    Tobacco Use    Smoking status: Unknown   Substance and Sexual Activity    Alcohol use: Defer    Drug use: Defer    Sexual activity: Defer       FAMILY HISTORY:  Family History   Problem Relation Age of Onset    Diabetes Sister     Hypertension Sister     Stroke Sister     Diabetes Brother     Hypertension Brother     Stroke Brother        REVIEW OF SYSTEMS:  Review of Systems   Constitutional:  Negative for chills, fatigue and fever.   HENT:  Negative for congestion, ear pain, rhinorrhea, sinus pressure and sore throat.    Eyes:  Negative for visual disturbance.   Respiratory:  Negative for cough, chest tightness, shortness of breath and wheezing.    Cardiovascular:  Negative for chest pain, palpitations and leg swelling.   Gastrointestinal:  Negative for abdominal pain, blood in stool, constipation, diarrhea, nausea and vomiting.   Endocrine: Negative for polydipsia and polyuria.   Genitourinary:  Negative for dysuria and hematuria.   Musculoskeletal:  Negative for arthralgias and back pain.   Skin:  Negative for rash.   Neurological:  Negative for dizziness, light-headedness, numbness and headaches.   Psychiatric/Behavioral:  Negative for dysphoric mood and sleep disturbance. The patient is not nervous/anxious.         PHYSICAL EXAMINATION:     VITAL SIGNS:  /80   Pulse 84   Temp 97.5 °F (36.4 °C)   Resp 20   Ht 170.2 cm (67\")   Wt 85.3 kg (188 lb)   SpO2 96%   BMI 29.44 kg/m²     Physical Exam  Vitals and nursing note reviewed.   Constitutional:       Appearance: Normal appearance. She is well-developed.   HENT:      Head: Normocephalic and atraumatic. "      Nose: Nose normal.      Mouth/Throat:      Mouth: Mucous membranes are moist.      Pharynx: No oropharyngeal exudate.   Eyes:      General: No scleral icterus.     Conjunctiva/sclera: Conjunctivae normal.      Pupils: Pupils are equal, round, and reactive to light.   Neck:      Thyroid: No thyromegaly.   Cardiovascular:      Rate and Rhythm: Normal rate and regular rhythm.      Heart sounds: Normal heart sounds. No murmur heard.     No friction rub. No gallop.   Pulmonary:      Effort: Pulmonary effort is normal. No respiratory distress.      Breath sounds: Normal breath sounds. No wheezing.   Abdominal:      General: Bowel sounds are normal. There is no distension.      Palpations: Abdomen is soft.      Tenderness: There is no abdominal tenderness.   Musculoskeletal:         General: No deformity or signs of injury.      Cervical back: Normal range of motion and neck supple.   Lymphadenopathy:      Cervical: No cervical adenopathy.   Skin:     General: Skin is warm and dry.      Findings: No rash.   Neurological:      Mental Status: She is alert and oriented to person, place, and time.   Psychiatric:         Mood and Affect: Mood normal.         Behavior: Behavior normal.       RECORDS REVIEW:   Results  CBC and CMP from 7/14/25 in normal range    ASSESSMENT     Diagnoses and all orders for this visit:    1. Hip swelling, left (Primary)    2. Closed trimalleolar fracture of right ankle with routine healing, subsequent encounter    3. Type 2 diabetes mellitus without complication, without long-term current use of insulin    4. Gastroesophageal reflux disease without esophagitis    5. Macular degeneration, unspecified laterality, unspecified type    6. Anxiety    7. Essential hypertension    8. Hypothyroidism, unspecified type    9. 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 Chronic pain syndrome        Assessment & Plan      Left Hip wound./ swelling  - US for DVT studies were reviewed from  7/17/25 and DVT was ruled out.  LE edema appears to be dependent edema for which compression and elevation is appropriate.     Right trimalleolar ankle fracture with dislocation  - Status post TTC nail 3/21/2024  - stable, following with orthopedics.     Osteoporosis  - Continue vitamin D 1000U daily    Acute blood loss anemia  - Hb has post operatively been stable.     Type 2 diabetes mellitus  - Continue current insulin regimen in facility.  Glucose has been in reasonable control.    Hypertension  - Stable on current medication regimen.  Continue amlodipine.  Remains off Atenolol.     Macular degeneration  - Continue current eyedrops    Anxiety  - Stable on hydroxyzine as needed    Asthma  - Stable on current inhaler regimen    GERD  - Continue Protonix    Hypothyroidism  - Stable on levothyroxine        [x]  Discussed Patient in detail with nursing/staff, addressed all needs today.     [x]  Plan of Care Reviewed   []  PT/OT Reviewed   [x]  Order Changes  []  Discharge Plans Reviewed  [x]  Advance Directive on file with Nursing Home.   [x]  POA on file with Nursing Home.    [x]  Code Status listed and reviewed.     I confirm accuracy of unchanged data/findings including physical exam and plan which have been carried forward from previous visit, as well as I have updated appropriately those that have changed        Jaxson Rodriguez DO.  7/28/2025      Patient or patient representative verbalized consent for the use of Ambient Listening during the visit with  Jaxson Rodriguez DO for chart documentation. 7/28/2025  08:29 EDT

## 2025-07-24 NOTE — LETTER
Nursing Home Progress Note        Jaxson Rodriguez DO   [address] [place] [address] Phone: [phone]  Fax: [phone]     PATIENT NAME: Dottie Jim                                                                          YOB: 1935           DATE OF SERVICE: 07/24/2025  FACILITY: [facility]       CHIEF COMPLAINT:  Chronic Medical Management      History of Present Illness  The patient presents for evaluation of a hip wound and chronic medical management.  She has been stable on current medications with regards to glucose control, mood, and hypothyroidism.     She reports that her hip is in a satisfactory condition. However, she experienced some fatigue earlier today. She has been advised to undergo an ultrasound examination to investigate the source of the drainage from her wound. She applied a patch to the wound this morning.       PAST MEDICAL & SURGICAL HISTORY:   Past Medical History:   Diagnosis Date   • Acute respiratory failure with hypoxia    • Allergic    • Arthritis    • CKD (chronic kidney disease)    • Constipation, unspecified    • Depression, unspecified    • Displaced trimalleolar fracture of right lower leg, subsequent encounter for closed fracture with routine healing    • Encephalopathy, unspecified    • Essential hypertension    • Fall at home    • Heartburn    • Hiatal hernia    • Hiatal hernia with GERD without esophagitis    • Hyperlipidemia    • Hypothyroidism    • Mood disorder    • Muscle weakness (generalized)    • Other iron deficiency anemias    • Pain, unspecified    • Type 2 diabetes mellitus without complications    • Unspecified asthma, uncomplicated    • Unspecified dementia, unspecified severity, with anxiety    • Unspecified macular degeneration    • Urinary tract infection, site not specified       Past Surgical History:   Procedure Laterality Date   • BACK SURGERY     • FOOT FRACTURE SURGERY Right 03/2024    RIGHT HINDFOOT TTC FUSION NAIL  "        MEDICATIONS:  I have reviewed and reconciled the patients medication list in the patients chart at the skilled nursing facility today, 07/24/2025.      ALLERGIES:  Allergies   Allergen Reactions   • Sulfamethoxazole-Trimethoprim Hives   • Atorvastatin Unknown - High Severity         SOCIAL HISTORY:  Social History     Socioeconomic History   • Marital status:    Tobacco Use   • Smoking status: Unknown   Substance and Sexual Activity   • Alcohol use: Defer   • Drug use: Defer   • Sexual activity: Defer       FAMILY HISTORY:  Family History   Problem Relation Age of Onset   • Diabetes Sister    • Hypertension Sister    • Stroke Sister    • Diabetes Brother    • Hypertension Brother    • Stroke Brother        REVIEW OF SYSTEMS:  Review of Systems   Constitutional:  Negative for chills, fatigue and fever.   HENT:  Negative for congestion, ear pain, rhinorrhea, sinus pressure and sore throat.    Eyes:  Negative for visual disturbance.   Respiratory:  Negative for cough, chest tightness, shortness of breath and wheezing.    Cardiovascular:  Negative for chest pain, palpitations and leg swelling.   Gastrointestinal:  Negative for abdominal pain, blood in stool, constipation, diarrhea, nausea and vomiting.   Endocrine: Negative for polydipsia and polyuria.   Genitourinary:  Negative for dysuria and hematuria.   Musculoskeletal:  Negative for arthralgias and back pain.   Skin:  Negative for rash.   Neurological:  Negative for dizziness, light-headedness, numbness and headaches.   Psychiatric/Behavioral:  Negative for dysphoric mood and sleep disturbance. The patient is not nervous/anxious.         PHYSICAL EXAMINATION:     VITAL SIGNS:  /80   Pulse 84   Temp 97.5 °F (36.4 °C)   Resp 20   Ht 170.2 cm (67\")   Wt 85.3 kg (188 lb)   SpO2 96%   BMI 29.44 kg/m²     Physical Exam  Vitals and nursing note reviewed.   Constitutional:       Appearance: Normal appearance. She is well-developed.   HENT:      " Head: Normocephalic and atraumatic.      Nose: Nose normal.      Mouth/Throat:      Mouth: Mucous membranes are moist.      Pharynx: No oropharyngeal exudate.   Eyes:      General: No scleral icterus.     Conjunctiva/sclera: Conjunctivae normal.      Pupils: Pupils are equal, round, and reactive to light.   Neck:      Thyroid: No thyromegaly.   Cardiovascular:      Rate and Rhythm: Normal rate and regular rhythm.      Heart sounds: Normal heart sounds. No murmur heard.     No friction rub. No gallop.   Pulmonary:      Effort: Pulmonary effort is normal. No respiratory distress.      Breath sounds: Normal breath sounds. No wheezing.   Abdominal:      General: Bowel sounds are normal. There is no distension.      Palpations: Abdomen is soft.      Tenderness: There is no abdominal tenderness.   Musculoskeletal:         General: No deformity or signs of injury.      Cervical back: Normal range of motion and neck supple.   Lymphadenopathy:      Cervical: No cervical adenopathy.   Skin:     General: Skin is warm and dry.      Findings: No rash.   Neurological:      Mental Status: She is alert and oriented to person, place, and time.   Psychiatric:         Mood and Affect: Mood normal.         Behavior: Behavior normal.       RECORDS REVIEW:   Results  CBC and CMP from 7/14/25 in normal range    ASSESSMENT     Diagnoses and all orders for this visit:    1. Hip swelling, left (Primary)    2. Closed trimalleolar fracture of right ankle with routine healing, subsequent encounter    3. Type 2 diabetes mellitus without complication, without long-term current use of insulin    4. Gastroesophageal reflux disease without esophagitis    5. Macular degeneration, unspecified laterality, unspecified type    6. Anxiety    7. Essential hypertension    8. Hypothyroidism, unspecified type    9. 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 Chronic pain syndrome        Assessment & Plan      Left Hip wound./ swelling  - US  for DVT studies were reviewed from 7/17/25 and DVT was ruled out.  LE edema appears to be dependent edema for which compression and elevation is appropriate.     Right trimalleolar ankle fracture with dislocation  - Status post TTC nail 3/21/2024  - stable, following with orthopedics.     Osteoporosis  - Continue vitamin D 1000U daily    Acute blood loss anemia  - Hb has post operatively been stable.     Type 2 diabetes mellitus  - Continue current insulin regimen in facility.  Glucose has been in reasonable control.    Hypertension  - Stable on current medication regimen.  Continue amlodipine.  Remains off Atenolol.     Macular degeneration  - Continue current eyedrops    Anxiety  - Stable on hydroxyzine as needed    Asthma  - Stable on current inhaler regimen    GERD  - Continue Protonix    Hypothyroidism  - Stable on levothyroxine        [x]  Discussed Patient in detail with nursing/staff, addressed all needs today.     [x]  Plan of Care Reviewed   []  PT/OT Reviewed   [x]  Order Changes  []  Discharge Plans Reviewed  [x]  Advance Directive on file with Nursing Home.   [x]  POA on file with Nursing Home.    [x]  Code Status listed and reviewed.     I confirm accuracy of unchanged data/findings including physical exam and plan which have been carried forward from previous visit, as well as I have updated appropriately those that have changed        Jaxson Rodriguez DO.  7/28/2025      Patient or patient representative verbalized consent for the use of Ambient Listening during the visit with  Jaxson Rodriguez DO for chart documentation. 7/28/2025  08:29 EDT